# Patient Record
Sex: FEMALE | Race: WHITE
[De-identification: names, ages, dates, MRNs, and addresses within clinical notes are randomized per-mention and may not be internally consistent; named-entity substitution may affect disease eponyms.]

---

## 2018-09-09 ENCOUNTER — HOSPITAL ENCOUNTER (OUTPATIENT)
Dept: HOSPITAL 92 - L&D/OP | Age: 28
LOS: 1 days | Discharge: HOME | End: 2018-09-10
Attending: OBSTETRICS & GYNECOLOGY
Payer: COMMERCIAL

## 2018-09-09 VITALS — SYSTOLIC BLOOD PRESSURE: 134 MMHG | TEMPERATURE: 98.2 F | DIASTOLIC BLOOD PRESSURE: 74 MMHG

## 2018-09-09 VITALS — BODY MASS INDEX: 51.7 KG/M2

## 2018-09-09 DIAGNOSIS — F31.9: ICD-10-CM

## 2018-09-09 DIAGNOSIS — N89.8: ICD-10-CM

## 2018-09-09 DIAGNOSIS — Z3A.36: ICD-10-CM

## 2018-09-09 DIAGNOSIS — O99.213: ICD-10-CM

## 2018-09-09 DIAGNOSIS — O99.89: Primary | ICD-10-CM

## 2018-09-09 DIAGNOSIS — Z88.5: ICD-10-CM

## 2018-09-09 DIAGNOSIS — E66.01: ICD-10-CM

## 2018-09-09 DIAGNOSIS — O99.343: ICD-10-CM

## 2018-09-09 PROCEDURE — 87510 GARDNER VAG DNA DIR PROBE: CPT

## 2018-09-09 PROCEDURE — 99284 EMERGENCY DEPT VISIT MOD MDM: CPT

## 2018-09-09 PROCEDURE — 84112 EVAL AMNIOTIC FLUID PROTEIN: CPT

## 2018-09-09 PROCEDURE — 87660 TRICHOMONAS VAGIN DIR PROBE: CPT

## 2018-09-09 PROCEDURE — 87480 CANDIDA DNA DIR PROBE: CPT

## 2018-09-09 NOTE — PDOC.LDHP
Labor and Delivery H&P


Chief complaint: loss of fluid


HPI: 





Patient of Dr Pearl





Patient is a 27 yo G1Po at 36 weeks 5 days with possible LOF. No VB, good FM, 

unsure if vag dsch or LOF. No fevers





Review of Systems: Complete ROS performed and as per HPI


Current gestational age (weeks): 36 (5 days)


Due date: 10/02/18


Dating criteria: last menstrual period


Grav: 1


Para: 0


Current pregnancy complications: other (Obesity (morbid))


Abnormal US findings: No


Past Medical History: 





HX Bipolar


Current medications: pre-alexx vitamins


Allergies/Adverse Reactions: 


 Allergies











Allergy/AdvReac Type Severity Reaction Status Date / Time


 


tramadol Allergy   Verified 18 02:15











Social history: none





- Physical Exam


Vital signs reviewed and normal: yes (134/74 98.2 107)


General: NAD


Heart: RRR


Lungs: CTAB


Abdomen: gravid (Obese)


FHT: category 1


[place] contractions every: none





- Assessment





36 weeks, leakage  possible; obesity; Bipolar





- Plan


Plan: observation in L&D (Amnisure sent. We will perform SSE)

## 2018-09-10 LAB — AMNISURE INTERNAL CONTROL QC: (no result)

## 2018-09-10 NOTE — PDOC.EVN
Event Note





- Event Note


Event Note: 





Sterile spec exam discussed with patient. Performed by me with RN in room. No 

evidence ROM. Valsalva and cough negative. I suspect BV due to white cream 

colored dsch.


I have collected a VP3

## 2018-09-10 NOTE — PDOC.EVN
Event Note





- Event Note


Event Note: 





I just called lab: VP3 will not be run until tomorrow. I will give an RX for 

Flagyl po BID and have her follow up with Ryanne for results.


Emperic RX for BV

## 2018-09-15 ENCOUNTER — HOSPITAL ENCOUNTER (OUTPATIENT)
Dept: HOSPITAL 92 - L&D/OP | Age: 28
Discharge: HOME | End: 2018-09-15
Attending: OBSTETRICS & GYNECOLOGY
Payer: COMMERCIAL

## 2018-09-15 VITALS — BODY MASS INDEX: 51.7 KG/M2

## 2018-09-15 DIAGNOSIS — F43.10: ICD-10-CM

## 2018-09-15 DIAGNOSIS — O99.343: ICD-10-CM

## 2018-09-15 DIAGNOSIS — F41.9: ICD-10-CM

## 2018-09-15 DIAGNOSIS — O36.8130: Primary | ICD-10-CM

## 2018-09-15 DIAGNOSIS — O16.3: ICD-10-CM

## 2018-09-15 DIAGNOSIS — Z88.5: ICD-10-CM

## 2018-09-15 DIAGNOSIS — F31.9: ICD-10-CM

## 2018-09-15 DIAGNOSIS — Z3A.37: ICD-10-CM

## 2018-09-15 PROCEDURE — 99282 EMERGENCY DEPT VISIT SF MDM: CPT

## 2018-09-15 NOTE — PDOC.FPROB
FMR OB H&P: HPI





- History of Present Illness


Chief Complaint: decreased fetal movement


History of Present Illness: 





29 yo  @ 37.4 presents for decreased fetal movement and elevated BP. Pmh of 

anxiety, depression, PTSD, and bipolar disorder, not currently on medications. 

Pt reports she had a bad dream that she had a still birth and awoke to 

decreased fetal movement. Reports that her baby is normally active in the am; 

however today she only noticed 5 kicks over a 2 hour period. She spoke with on 

call ob provider who encouraged her to check her BP and go to l&d if elevated. 

She checked her BP at a [facility] and it was found to be 160 systolic. 


Additionally, pt reports recent chiropractic adjustment of the hips and had 

resultant swelling of the mons pubis since this am. Denies erythema or warmth 

in the area. Denies associated fever, chills, NVDC, LOF, contractions, vaginal 

bleeding. Currently she reports good fetal movement. 


Primary Care Physician: 





Ryanne





FMR OB H&P: Current Pregnancy





- Prenatal Care


: 1


Para: 0


Dating Criteria: LMP c/w 16.1wk sono





- OB Labs


Blood type: A


RH: positive


Antibody Screen: negative


HIV: negative


RPR: negative


HepBsAg: negative


Rubella: immune


Gonorrhea: unknown


Chlamydia: unknown





FMR OB H&P: History





- Past Medical History


PMH: 





Bipolar, anxiety, depression, PTSD





- OB History


OB History: 


None





- Social History


Social History: 





denies tobacco, alcohol and drug use





FMR OB H&P: Medications





- Current


Home Medications: 


 











 Medication  Instructions  Recorded  Confirmed  Type


 


No Known  09/09/18 09/15/18 History











Allergies/Adverse Reactions: 


 Allergies











Allergy/AdvReac Type Severity Reaction Status Date / Time


 


tramadol Allergy  Hives Verified 09/15/18 13:01














FMR OB H&P: ROS





- Review of Systems


General: denies: fever/chills, recent trauma


ENT: denies: nasal congestion, sore throat


Cardiovascular: reports: edema.  denies: chest pain


Respiratory: denies: cough, congestion, shortness of breath


Gastrointestinal: reports: nausea (throughout pregnancy).  denies: abdominal 

pain, cramping, vomiting


Genitourinary (Female): denies: dysuria, hematuria, vaginal discharge, vaginal 

pain, vaginal bleeding


Musculoskeletal: reports: pain, tenderness, swelling (swelling of mons pubis s/

p chiropractic treatment)


Psychological: reports: depression, anxiety





FMR OB H&P: Vital Signs





- Maternal


Vital signs: 





T: 97.9F 


BP: 127/62


RR: 18 BPM


HR: 103








- Fetal Heart Tones


Baseline: 140


Variability: moderate


Acceleration: present


Deceleration: absent


Category: category 1


[place] contractions every: None





FMR OB H&P: Physical Exam





- Physical Exam


General: NAD, other (Morbidly Obese)


HEENT: normocephalic and atraumatic, PERRLA, EOMI, MMM, conjunctiva clear, 

grossly normal vision, grossly normal hearing


Neck: FROM, trachea midline


Chest: non-tender to palpation


Heart: RRR, normal S1/S2, no murmurs/rubs/gallops, no edema


General: CTAB, no respiratory distress, good air movement, no rales/rhonchi, no 

wheezing, no retractions


Abdomen: soft, gravid, non-tender, bowel sound present, no masses


Musculoskeletal: pulses present


Skin: no rash





- Pelvic Exam


Vulva: normal hair distribution


Deviation from normal: Mons pubis appears normal, no erythema, edema or warmth. 

Pt morbidly obese.





FMR OB H&P: A/P





- Problem List


(1) Pregnancy


Current Visit: Yes   Status: Acute   


Assessment and Plan: 





-known h/o BV and has not started medications yet, encouraged to take meds if/

when pt dc'd to home


-reports good fetal movement and fetal heart tracings show mod variability with 

baseline 140s; stable for DC to home


- decreased fetal movement has resolved: encouraged kick counts


- concern for elevated BP: BP taken at outside pharmacy showed elevated BP, but 

given pts body habitus likely related to improperly sized cuff. BP on L&D WNL 

and pt currently asymptomatic, repeat BPs normotensive. Pt stable for DC to 

home with OP f/u with primary OB provider








Disposition: 





stable, DC to home. Reactive EFM/FHTs and resolution of decreased fetal movement


Discussion: 


Date/Time: 09/15/18 1405











This H&P was discussed with Dr. Larios who agree with the above documentation 

and plan.

## 2018-09-26 ENCOUNTER — HOSPITAL ENCOUNTER (INPATIENT)
Dept: HOSPITAL 92 - L&D | Age: 28
LOS: 4 days | Discharge: HOME | End: 2018-09-30
Attending: OBSTETRICS & GYNECOLOGY | Admitting: OBSTETRICS & GYNECOLOGY
Payer: COMMERCIAL

## 2018-09-26 VITALS — BODY MASS INDEX: 47.4 KG/M2

## 2018-09-26 DIAGNOSIS — F31.9: ICD-10-CM

## 2018-09-26 DIAGNOSIS — F43.10: ICD-10-CM

## 2018-09-26 DIAGNOSIS — D62: ICD-10-CM

## 2018-09-26 DIAGNOSIS — Z91.410: ICD-10-CM

## 2018-09-26 DIAGNOSIS — E66.01: ICD-10-CM

## 2018-09-26 DIAGNOSIS — Z3A.39: ICD-10-CM

## 2018-09-26 LAB
ALBUMIN SERPL BCG-MCNC: 3.2 G/DL (ref 3.5–5)
ALP SERPL-CCNC: 180 U/L (ref 40–150)
ALT SERPL W P-5'-P-CCNC: 9 U/L (ref 8–55)
ANION GAP SERPL CALC-SCNC: 14 MMOL/L (ref 10–20)
AST SERPL-CCNC: 10 U/L (ref 5–34)
BILIRUB SERPL-MCNC: 0.2 MG/DL (ref 0.2–1.2)
BUN SERPL-MCNC: 9 MG/DL (ref 7–18.7)
CALCIUM SERPL-MCNC: 9.2 MG/DL (ref 7.8–10.44)
CHLORIDE SERPL-SCNC: 106 MMOL/L (ref 98–107)
CO2 SERPL-SCNC: 19 MMOL/L (ref 22–29)
CREAT CL PREDICTED SERPL C-G-VRATE: 282 ML/MIN (ref 70–130)
CRYSTAL-AUWI FLAG: 0.2 (ref 0–15)
GLOBULIN SER CALC-MCNC: 3.3 G/DL (ref 2.4–3.5)
GLUCOSE SERPL-MCNC: 121 MG/DL (ref 70–105)
HBSAG INDEX: 0.17 S/CO (ref 0–0.99)
HEV IGM SER QL: 12.6 (ref 0–7.99)
HGB BLD-MCNC: 11.9 G/DL (ref 12–16)
HYALINE CASTS #/AREA URNS LPF: (no result) LPF
MCH RBC QN AUTO: 24.8 PG (ref 27–31)
MCV RBC AUTO: 78.7 FL (ref 78–98)
PATHC CAST-AUWI FLAG: 0.29 (ref 0–2.49)
PLATELET # BLD AUTO: 189 THOU/UL (ref 130–400)
POTASSIUM SERPL-SCNC: 3.9 MMOL/L (ref 3.5–5.1)
RBC # BLD AUTO: 4.81 MILL/UL (ref 4.2–5.4)
SODIUM SERPL-SCNC: 135 MMOL/L (ref 136–145)
SP GR UR STRIP: 1.02 (ref 1–1.04)
SPERM-AUWI FLAG: 0 (ref 0–9.9)
SYPHILIS ANTIBODY INDEX: 0.04 S/CO
WBC # BLD AUTO: 8.7 THOU/UL (ref 4.8–10.8)
WBC UR QL AUTO: (no result) HPF (ref 0–3)
YEAST-AUWI FLAG: 0 (ref 0–25)

## 2018-09-26 PROCEDURE — 86900 BLOOD TYPING SEROLOGIC ABO: CPT

## 2018-09-26 PROCEDURE — 86901 BLOOD TYPING SEROLOGIC RH(D): CPT

## 2018-09-26 PROCEDURE — 87340 HEPATITIS B SURFACE AG IA: CPT

## 2018-09-26 PROCEDURE — 80053 COMPREHEN METABOLIC PANEL: CPT

## 2018-09-26 PROCEDURE — 85025 COMPLETE CBC W/AUTO DIFF WBC: CPT

## 2018-09-26 PROCEDURE — 85610 PROTHROMBIN TIME: CPT

## 2018-09-26 PROCEDURE — 86780 TREPONEMA PALLIDUM: CPT

## 2018-09-26 PROCEDURE — 83735 ASSAY OF MAGNESIUM: CPT

## 2018-09-26 PROCEDURE — 81001 URINALYSIS AUTO W/SCOPE: CPT

## 2018-09-26 PROCEDURE — 85027 COMPLETE CBC AUTOMATED: CPT

## 2018-09-26 PROCEDURE — 85730 THROMBOPLASTIN TIME PARTIAL: CPT

## 2018-09-26 PROCEDURE — P9016 RBC LEUKOCYTES REDUCED: HCPCS

## 2018-09-26 PROCEDURE — 36415 COLL VENOUS BLD VENIPUNCTURE: CPT

## 2018-09-26 PROCEDURE — 85384 FIBRINOGEN ACTIVITY: CPT

## 2018-09-26 PROCEDURE — 36430 TRANSFUSION BLD/BLD COMPNT: CPT

## 2018-09-26 PROCEDURE — 51702 INSERT TEMP BLADDER CATH: CPT

## 2018-09-26 PROCEDURE — 86850 RBC ANTIBODY SCREEN: CPT

## 2018-09-27 RX ADMIN — MAGNESIUM SULFATE HEPTAHYDRATE SCH MLS: 40 INJECTION, SOLUTION INTRAVENOUS at 09:12

## 2018-09-27 RX ADMIN — MAGNESIUM SULFATE HEPTAHYDRATE SCH MLS: 40 INJECTION, SOLUTION INTRAVENOUS at 19:15

## 2018-09-27 RX ADMIN — BUPIVACAINE HYDROCHLORIDE SCH MLS: 5 INJECTION, SOLUTION EPIDURAL; INTRACAUDAL at 10:13

## 2018-09-27 RX ADMIN — SODIUM CHLORIDE SCH MLS: 0.9 INJECTION, SOLUTION INTRAVENOUS at 20:16

## 2018-09-27 RX ADMIN — MAGNESIUM SULFATE HEPTAHYDRATE SCH MLS: 40 INJECTION, SOLUTION INTRAVENOUS at 01:44

## 2018-09-27 RX ADMIN — BUPIVACAINE HYDROCHLORIDE SCH MLS: 5 INJECTION, SOLUTION EPIDURAL; INTRACAUDAL at 23:45

## 2018-09-27 RX ADMIN — BUPIVACAINE HYDROCHLORIDE SCH MLS: 5 INJECTION, SOLUTION EPIDURAL; INTRACAUDAL at 18:05

## 2018-09-27 RX ADMIN — SODIUM CHLORIDE SCH MLS: 0.9 INJECTION, SOLUTION INTRAVENOUS at 09:08

## 2018-09-27 NOTE — PDOC.LDPN
Labor & Delivery Progress Note





- Subjective


Subjective: comfortable





- Objective


Vital signs reviewed and normal: yes


General: NAD


Uterine fundus: non tender


FHT: category 1


[place] contractions every: 2-3min


Plan: continue plan of care
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
## 2018-09-28 LAB
APTT PPP: 29.4 SEC (ref 22.9–36.1)
BASOPHILS # BLD AUTO: 0 THOU/UL (ref 0–0.2)
BASOPHILS NFR BLD AUTO: 0 % (ref 0–1)
EOSINOPHIL # BLD AUTO: 0 THOU/UL (ref 0–0.7)
EOSINOPHIL NFR BLD AUTO: 0.2 % (ref 0–10)
FIBRINOGEN PPP-MCNC: 545 MG/DL (ref 253–463)
HGB BLD-MCNC: 10.2 G/DL (ref 12–16)
HGB BLD-MCNC: 9 G/DL (ref 12–16)
HGB BLD-MCNC: 9.6 G/DL (ref 12–16)
INR PPP: 1
LYMPHOCYTES # BLD: 1 THOU/UL (ref 1.2–3.4)
LYMPHOCYTES NFR BLD AUTO: 8.3 % (ref 21–51)
MCH RBC QN AUTO: 25.3 PG (ref 27–31)
MCH RBC QN AUTO: 25.9 PG (ref 27–31)
MCH RBC QN AUTO: 26.4 PG (ref 27–31)
MCV RBC AUTO: 78.4 FL (ref 78–98)
MCV RBC AUTO: 79.4 FL (ref 78–98)
MCV RBC AUTO: 79.8 FL (ref 78–98)
MONOCYTES # BLD AUTO: 0.5 THOU/UL (ref 0.11–0.59)
MONOCYTES NFR BLD AUTO: 4.6 % (ref 0–10)
NEUTROPHILS # BLD AUTO: 10 THOU/UL (ref 1.4–6.5)
NEUTROPHILS NFR BLD AUTO: 86.9 % (ref 42–75)
PLATELET # BLD AUTO: 160 THOU/UL (ref 130–400)
PLATELET # BLD AUTO: 168 THOU/UL (ref 130–400)
PLATELET # BLD AUTO: 170 THOU/UL (ref 130–400)
PROTHROMBIN TIME: 13.5 SEC (ref 12–14.7)
RBC # BLD AUTO: 3.41 MILL/UL (ref 4.2–5.4)
RBC # BLD AUTO: 3.71 MILL/UL (ref 4.2–5.4)
RBC # BLD AUTO: 4.02 MILL/UL (ref 4.2–5.4)
WBC # BLD AUTO: 10.8 THOU/UL (ref 4.8–10.8)
WBC # BLD AUTO: 11.5 THOU/UL (ref 4.8–10.8)
WBC # BLD AUTO: 14.2 THOU/UL (ref 4.8–10.8)

## 2018-09-28 PROCEDURE — 0KQM0ZZ REPAIR PERINEUM MUSCLE, OPEN APPROACH: ICD-10-PCS | Performed by: OBSTETRICS & GYNECOLOGY

## 2018-09-28 PROCEDURE — 4A1HXCZ MONITORING OF PRODUCTS OF CONCEPTION, CARDIAC RATE, EXTERNAL APPROACH: ICD-10-PCS | Performed by: OBSTETRICS & GYNECOLOGY

## 2018-09-28 PROCEDURE — 30233N1 TRANSFUSION OF NONAUTOLOGOUS RED BLOOD CELLS INTO PERIPHERAL VEIN, PERCUTANEOUS APPROACH: ICD-10-PCS | Performed by: OBSTETRICS & GYNECOLOGY

## 2018-09-28 PROCEDURE — 4A1HXFZ MONITORING OF PRODUCTS OF CONCEPTION, CARDIAC RHYTHM, EXTERNAL APPROACH: ICD-10-PCS | Performed by: OBSTETRICS & GYNECOLOGY

## 2018-09-28 RX ADMIN — MAGNESIUM SULFATE HEPTAHYDRATE SCH MLS: 40 INJECTION, SOLUTION INTRAVENOUS at 04:56

## 2018-09-28 RX ADMIN — Medication PRN MLS: at 01:45

## 2018-09-28 RX ADMIN — HYDROCODONE BITARTRATE AND ACETAMINOPHEN PRN TAB: 5; 325 TABLET ORAL at 14:33

## 2018-09-28 RX ADMIN — DOCUSATE CALCIUM SCH: 240 CAPSULE, LIQUID FILLED ORAL at 23:20

## 2018-09-28 RX ADMIN — DOCUSATE CALCIUM SCH: 240 CAPSULE, LIQUID FILLED ORAL at 10:55

## 2018-09-28 RX ADMIN — HYDROCODONE BITARTRATE AND ACETAMINOPHEN PRN TAB: 5; 325 TABLET ORAL at 20:05

## 2018-09-28 RX ADMIN — MAGNESIUM SULFATE HEPTAHYDRATE SCH MLS: 40 INJECTION, SOLUTION INTRAVENOUS at 15:25

## 2018-09-28 RX ADMIN — Medication PRN MLS: at 00:51

## 2018-09-28 NOTE — PDOC.PP
Post Partum Progress Note


Post Partum Day #: 0


Subjective: 





Feels fine, no dizziness lightheadedness shortness of breath, +fatigue but 

better after resting and transfusion.  Pain minimal.  No PIH sx. 


PO intake tolerated: yes


Flatus: yes


Ambulation: no


 Vital Signs (12 hours)











  Temp


 


 09/28/18 03:51  98.1 F


 


 09/27/18 21:32  97.8 F








 Weight











Weight                         303 lb

















- Physical Examination


General: NAD


Cardiovascular: RRR


Respiratory: clear to auscultation bilaterally, non-labored breathing


Abdominal: no distention, appropriately TTP


Fundus firm & at: umb


Extremities: negative homans (B) (1+ HOWARD bilaterally)


Neurological: no gross focal deficits


Psychiatric: normal affect


Result Diagrams: 


 09/28/18 04:59





 09/26/18 22:10


Additional Labs: 


 Post Partum Labs











Blood Type  A POSITIVE   09/26/18  22:18    


 


Hep Bs Antigen  Non-Reactive S/CO (NonReactive)   09/26/18  22:10    














- Assessment/Plan


PPD0 s/p TSVD complicated by severe PEC and PPH


VSSAF- BP, pulse improved and normal s/p transfusion.


PPH hgb 11.9-->qbl 2564cc-->10.2-->1U PRBC-->9.6, no sx anemia, will cont to 

trend serial hgb.  If vital sign changes or sx would transfuse additional PRBC.

  Will cont Iron and PNV on DC.


Severe PIH- on Mag, no sx PIH or mag toxicity, UOP excellent, labs wnl, cont 

Mag x 24h then DC curtis and transfer to floor.


Breastfeeding


Up with assist


Rh pos RImm


Cont LICU care.

## 2018-09-28 NOTE — PDOC.OPDEL
OB Operative/Delivery Note


Delivery Dr/Surgeon: Ryanne


Assist: n/a


Pre-Delivery Diagnosis: medically indicated induction (Severe Preeclampsia)


Procedure/Post Delivery Dx: spontaneous vaginal delivery


Weeks gestation: 39


Anesthesia: epidural





- Findings


  ** A


Sex: female


Apgar - 1 min: 9


Apgar - 5 min: 9





- Additional Findings/Plan


Placenta delivered: spontaneous


Repaired Obstetrical Laceration: 2nd degree (repaired with 2-0 vicryl in usual 

fashion excellent hemostasis, no other vaginal or cervical lacs noted on exam)


Estimated blood loss: 2564 qbl


Compilations/Other Findings: 





PPH- during delivery of placenta slow trickle was noted, once placenta 

delivered brisk bleeding was noted, immediate bimanual massage and uterotonics 

called for while pitocin bolus going in.  Boggy uterus noted, sweep of uterus 

did not reveal any retained products, just clot accumulating.  Massage with aid 

of nurse was continued while hemabate was being given.  Methergine 

contraindicated due to patient's severe hypertension.  Ramirez catheter replaced, 

cytotec 800mcg placed in rectum, continue massage performed and uterine tone 

started to improve.  Thorough inspection of vagina and cervix revealed second 

degree perineal lac.  This was repaired with 2-0 vicryl in usual fashion with 

excellent hemostasis.  Bimanual sweep revealed firm fundus with clot in lower 

segment that was cleared and out massaged.  The tone improved thereafter and 

bleeding was minimal, appropriate.  Labs were drawn and T&C ordered.  

Clinically pt was asymptomatic and pulse was in 110s.  If symptoms of anemia 

arise will start transfusion, if not then will draw serial H/H q 6h.  Continue 

Mag for severe PEC x 24h after delivery.


Post delivery plan: recovery in LICU

## 2018-09-29 VITALS — TEMPERATURE: 98.1 F

## 2018-09-29 LAB
HGB BLD-MCNC: 8.5 G/DL (ref 12–16)
MCH RBC QN AUTO: 26.4 PG (ref 27–31)
MCV RBC AUTO: 80.9 FL (ref 78–98)
PLATELET # BLD AUTO: 162 THOU/UL (ref 130–400)
RBC # BLD AUTO: 3.23 MILL/UL (ref 4.2–5.4)
WBC # BLD AUTO: 7.8 THOU/UL (ref 4.8–10.8)

## 2018-09-29 RX ADMIN — DOCUSATE CALCIUM SCH: 240 CAPSULE, LIQUID FILLED ORAL at 19:55

## 2018-09-29 RX ADMIN — HYDROCODONE BITARTRATE AND ACETAMINOPHEN PRN TAB: 5; 325 TABLET ORAL at 05:34

## 2018-09-29 RX ADMIN — DOCUSATE CALCIUM SCH MG: 240 CAPSULE, LIQUID FILLED ORAL at 09:14

## 2018-09-29 RX ADMIN — HYDROCODONE BITARTRATE AND ACETAMINOPHEN PRN TAB: 5; 325 TABLET ORAL at 19:49

## 2018-09-29 NOTE — PDOC.PP
Post Partum Progress Note


Post Partum Day #: #1


Subjective: 


Denies HA or blurry vision


PO intake tolerated: yes


Flatus: yes


Ambulation: no


 Weight











Weight                         137.438 kg

















- Physical Examination


General: NAD


Respiratory: non-labored breathing


Abdominal: no distention


Psychiatric: normal affect


Result Diagrams: 


 09/28/18 11:26





 09/26/18 22:10


Additional Labs: 


 Post Partum Labs











Blood Type  A POSITIVE   09/26/18  22:18    


 


Hep Bs Antigen  Non-Reactive S/CO (NonReactive)   09/26/18  22:10    














- Assessment/Plan


Doing well.


Stable VS and good UO; PIH resolving.


DC Mg later this AM and transfer to floor.

## 2018-09-30 VITALS — DIASTOLIC BLOOD PRESSURE: 76 MMHG | SYSTOLIC BLOOD PRESSURE: 125 MMHG

## 2018-09-30 RX ADMIN — HYDROCODONE BITARTRATE AND ACETAMINOPHEN PRN TAB: 5; 325 TABLET ORAL at 11:18

## 2018-09-30 RX ADMIN — DOCUSATE CALCIUM SCH MG: 240 CAPSULE, LIQUID FILLED ORAL at 09:23

## 2018-09-30 RX ADMIN — HYDROCODONE BITARTRATE AND ACETAMINOPHEN PRN TAB: 5; 325 TABLET ORAL at 04:02

## 2018-09-30 NOTE — DIS
DATE OF ENCOUNTER:  2018

 

DATE OF ADMISSION:  2018

 

DATE OF DISCHARGE:  2018

 

ADMITTING DIAGNOSES:

1.  Intrauterine pregnancy at 39 weeks.

2.  Preeclampsia with severe features.

3.  Morbid obesity.

 

DISCHARGE DIAGNOSES:

1.  Intrauterine pregnancy at 39 weeks.

2.  Preeclampsia with severe features.

3.  Morbid obesity.

4.  Postpartum hemorrhage.

5.  Acute blood loss anemia.

 

PROCEDURE:  Term spontaneous vaginal delivery with postpartum hemorrhage and blood transfusion.

 

HOSPITAL COURSE:  Patient is a 28-year-old  female, who was admitted to the hospital for inducti
on of labor secondary to preeclampsia with severe features.  She was placed on magnesium for seizure 
prophylaxis.  Labor resulted in a spontaneous vaginal delivery with a second-degree laceration and a 
2564 mL quantitative blood loss due to atony, which was remedied with medical management.  Please ref
er to the delivery note for complete details.  The patient was resuscitated with IV fluids and 1 unit
 of packed red blood cells.

 

Her post-delivery hemoglobin after transfusion 8.5, hematocrit 26.1, platelets of 162,000.  Starting 
hemoglobin was 11.9, hematocrit 37.9.  Today, she is postpartum day #2.  Blood pressures have remaine
d in the normal range with her most recent blood pressure being 122/58, pulse of 82, respiratory rate
 of 16.

GENERAL:  She appears to be in no acute distress.  She is alert and oriented, cooperative and pleasan
t to interact with.

HEENT:  Head is normocephalic, atraumatic.

ABDOMEN:  Fundus is firm at the umbilicus.

EXTREMITIES:  Have some pitting edema, but are symmetrical, 1+.

 

The patient will be discharged to home with instructions to follow up with Dr. Pearl in 1 week for 
blood pressure check.  She has also been given instructions to seek medical attention sooner if she e
xperiences fever, increasing pain, or bleeding or if her blood pressures at home begin rising into th
e severe range, which is above 160 on the top number and 105 on the bottom number.  The patient will 
be discharged with ibuprofen to be taken as needed for pain.

## 2019-06-25 ENCOUNTER — HOSPITAL ENCOUNTER (EMERGENCY)
Dept: HOSPITAL 92 - ERS | Age: 29
Discharge: HOME | End: 2019-06-25
Payer: COMMERCIAL

## 2019-06-25 DIAGNOSIS — B96.89: ICD-10-CM

## 2019-06-25 DIAGNOSIS — F31.9: ICD-10-CM

## 2019-06-25 DIAGNOSIS — F32.9: ICD-10-CM

## 2019-06-25 DIAGNOSIS — F43.10: ICD-10-CM

## 2019-06-25 DIAGNOSIS — N76.0: Primary | ICD-10-CM

## 2019-06-25 DIAGNOSIS — R10.84: ICD-10-CM

## 2019-06-25 DIAGNOSIS — F41.9: ICD-10-CM

## 2019-06-25 LAB
ALBUMIN SERPL BCG-MCNC: 4 G/DL (ref 3.5–5)
ALP SERPL-CCNC: 79 U/L (ref 40–150)
ALT SERPL W P-5'-P-CCNC: 24 U/L (ref 8–55)
ANION GAP SERPL CALC-SCNC: 12 MMOL/L (ref 10–20)
AST SERPL-CCNC: 14 U/L (ref 5–34)
BASOPHILS # BLD AUTO: 0.1 THOU/UL (ref 0–0.2)
BASOPHILS NFR BLD AUTO: 0.6 % (ref 0–1)
BILIRUB SERPL-MCNC: 0.3 MG/DL (ref 0.2–1.2)
BUN SERPL-MCNC: 8 MG/DL (ref 7–18.7)
CALCIUM SERPL-MCNC: 9.5 MG/DL (ref 7.8–10.44)
CHLORIDE SERPL-SCNC: 105 MMOL/L (ref 98–107)
CO2 SERPL-SCNC: 24 MMOL/L (ref 22–29)
CREAT CL PREDICTED SERPL C-G-VRATE: 0 ML/MIN (ref 70–130)
EOSINOPHIL # BLD AUTO: 0.2 THOU/UL (ref 0–0.7)
EOSINOPHIL NFR BLD AUTO: 1.7 % (ref 0–10)
GLOBULIN SER CALC-MCNC: 3.6 G/DL (ref 2.4–3.5)
GLUCOSE SERPL-MCNC: 92 MG/DL (ref 70–105)
HGB BLD-MCNC: 13 G/DL (ref 12–16)
HYALINE CASTS #/AREA URNS LPF: (no result) LPF
LIPASE SERPL-CCNC: 25 U/L (ref 8–78)
LYMPHOCYTES # BLD: 3 THOU/UL (ref 1.2–3.4)
LYMPHOCYTES NFR BLD AUTO: 30.9 % (ref 21–51)
MCH RBC QN AUTO: 25.3 PG (ref 27–31)
MCV RBC AUTO: 79.6 FL (ref 78–98)
MONOCYTES # BLD AUTO: 0.4 THOU/UL (ref 0.11–0.59)
MONOCYTES NFR BLD AUTO: 4.3 % (ref 0–10)
NEUTROPHILS # BLD AUTO: 6.1 THOU/UL (ref 1.4–6.5)
NEUTROPHILS NFR BLD AUTO: 62.5 % (ref 42–75)
PLATELET # BLD AUTO: 313 THOU/UL (ref 130–400)
POTASSIUM SERPL-SCNC: 4.1 MMOL/L (ref 3.5–5.1)
PREGU CONTROL BACKGROUND?: (no result)
PREGU CONTROL BAR APPEAR?: YES
RBC # BLD AUTO: 5.15 MILL/UL (ref 4.2–5.4)
SODIUM SERPL-SCNC: 137 MMOL/L (ref 136–145)
SP GR UR STRIP: 1.01 (ref 1–1.03)
WBC # BLD AUTO: 9.7 THOU/UL (ref 4.8–10.8)

## 2019-06-25 PROCEDURE — 76700 US EXAM ABDOM COMPLETE: CPT

## 2019-06-25 PROCEDURE — 83690 ASSAY OF LIPASE: CPT

## 2019-06-25 PROCEDURE — 36416 COLLJ CAPILLARY BLOOD SPEC: CPT

## 2019-06-25 PROCEDURE — 36415 COLL VENOUS BLD VENIPUNCTURE: CPT

## 2019-06-25 PROCEDURE — 96372 THER/PROPH/DIAG INJ SC/IM: CPT

## 2019-06-25 PROCEDURE — 87510 GARDNER VAG DNA DIR PROBE: CPT

## 2019-06-25 PROCEDURE — 80053 COMPREHEN METABOLIC PANEL: CPT

## 2019-06-25 PROCEDURE — 81025 URINE PREGNANCY TEST: CPT

## 2019-06-25 PROCEDURE — 85025 COMPLETE CBC W/AUTO DIFF WBC: CPT

## 2019-06-25 PROCEDURE — 87660 TRICHOMONAS VAGIN DIR PROBE: CPT

## 2019-06-25 PROCEDURE — 81015 MICROSCOPIC EXAM OF URINE: CPT

## 2019-06-25 PROCEDURE — 87491 CHLMYD TRACH DNA AMP PROBE: CPT

## 2019-06-25 PROCEDURE — 87591 N.GONORRHOEAE DNA AMP PROB: CPT

## 2019-06-25 PROCEDURE — 87480 CANDIDA DNA DIR PROBE: CPT

## 2019-06-25 PROCEDURE — 81003 URINALYSIS AUTO W/O SCOPE: CPT

## 2019-06-25 NOTE — ULT
Called [facility], waited on hold 10 minutes and was disconnected.  Called back, they were unable to access patient's information, but accepted the diagnoses writer gave.    ULTRASOUND ABDOMEN LIMITED:

(RIGHT UPPER QUADRANT)



DATE:

6/25/2019



HISTORY:

29-year-old female with right upper quadrant abdominal pain.



FINDINGS:

Gallbladder: Normal wall thickness. No evidence of pericholecystic fluid, gallstones, or sludge.

Liver: Diffusely increased echogenicity, consistent with fatty liver. Probably enlarged.

Common duct caliber:2 mm.

Right kidney: No hydronephrosis.

Pancreas: Obscured by shadowing from bowel gas.



IMPRESSION:

1) Hepatic steatosis and probable hepatomegaly.

2) no other pathology identified.

3) pancreas not visualized.



Reported By: Hugo Liao 

Electronically Signed:  6/25/2019 4:32 PM